# Patient Record
Sex: FEMALE | Race: WHITE | URBAN - METROPOLITAN AREA
[De-identification: names, ages, dates, MRNs, and addresses within clinical notes are randomized per-mention and may not be internally consistent; named-entity substitution may affect disease eponyms.]

---

## 2020-09-21 ENCOUNTER — OFFICE VISIT (OUTPATIENT)
Dept: URBAN - METROPOLITAN AREA CLINIC 71 | Facility: CLINIC | Age: 35
End: 2020-09-21
Payer: COMMERCIAL

## 2020-09-21 PROCEDURE — 92004 COMPRE OPH EXAM NEW PT 1/>: CPT | Performed by: OPTOMETRIST

## 2020-09-21 PROCEDURE — 92310 CONTACT LENS FITTING OU: CPT | Performed by: OPTOMETRIST

## 2020-09-21 ASSESSMENT — INTRAOCULAR PRESSURE
OS: 15
OD: 15

## 2020-09-21 ASSESSMENT — KERATOMETRY
OS: 43.50
OD: 43.63

## 2020-09-21 ASSESSMENT — VISUAL ACUITY
OD: 20/20
OS: 20/20

## 2020-10-05 ENCOUNTER — OFFICE VISIT (OUTPATIENT)
Dept: URBAN - METROPOLITAN AREA CLINIC 71 | Facility: CLINIC | Age: 35
End: 2020-10-05

## 2020-10-05 DIAGNOSIS — H52.13 MYOPIA, BILATERAL: Primary | ICD-10-CM

## 2020-10-05 PROCEDURE — V2799 MISC VISION ITEM OR SERVICE: HCPCS | Performed by: OPTOMETRIST

## 2020-10-05 ASSESSMENT — INTRAOCULAR PRESSURE
OS: 15
OD: 14

## 2020-10-05 ASSESSMENT — KERATOMETRY
OD: 40.75
OS: 41.25

## 2020-10-05 NOTE — IMPRESSION/PLAN
Impression: Myopia, bilateral: H52.13. Pt states OS is still slightly blurry. Adding cylinder correction seems to clear the vision adequately. Will give new CL rx for OS.  Plan: New CL Rx given

## 2022-04-07 ENCOUNTER — OFFICE VISIT (OUTPATIENT)
Dept: URBAN - METROPOLITAN AREA CLINIC 71 | Facility: CLINIC | Age: 37
End: 2022-04-07
Payer: COMMERCIAL

## 2022-04-07 PROCEDURE — 92012 INTRM OPH EXAM EST PATIENT: CPT | Performed by: OPTOMETRIST

## 2022-04-07 PROCEDURE — 92310 CONTACT LENS FITTING OU: CPT | Performed by: OPTOMETRIST

## 2022-04-07 ASSESSMENT — INTRAOCULAR PRESSURE
OD: 16
OS: 17

## 2022-04-07 ASSESSMENT — VISUAL ACUITY
OS: 20/20
OD: 20/20

## 2022-04-07 NOTE — IMPRESSION/PLAN
Impression: Myopia, bilateral: H52.13. New glasses rx generated and dispensed to patient. Contact lens evaluation done today. Patient states she was previously given a toric lens, but she does not tolerate them well and prefers to not have toric CL. Plan: Patient to call if there are any issues with the new rx. Return annually for routine vision exams.